# Patient Record
Sex: FEMALE | Race: WHITE | NOT HISPANIC OR LATINO | Employment: UNEMPLOYED | URBAN - METROPOLITAN AREA
[De-identification: names, ages, dates, MRNs, and addresses within clinical notes are randomized per-mention and may not be internally consistent; named-entity substitution may affect disease eponyms.]

---

## 2021-07-22 ENCOUNTER — APPOINTMENT (OUTPATIENT)
Dept: RADIOLOGY | Facility: MEDICAL CENTER | Age: 31
End: 2021-07-22
Attending: EMERGENCY MEDICINE
Payer: COMMERCIAL

## 2021-07-22 ENCOUNTER — HOSPITAL ENCOUNTER (EMERGENCY)
Facility: MEDICAL CENTER | Age: 31
End: 2021-07-22
Attending: EMERGENCY MEDICINE
Payer: COMMERCIAL

## 2021-07-22 VITALS
BODY MASS INDEX: 45.99 KG/M2 | HEIGHT: 67 IN | OXYGEN SATURATION: 95 % | WEIGHT: 293 LBS | RESPIRATION RATE: 20 BRPM | TEMPERATURE: 98.1 F | SYSTOLIC BLOOD PRESSURE: 140 MMHG | DIASTOLIC BLOOD PRESSURE: 79 MMHG | HEART RATE: 82 BPM

## 2021-07-22 DIAGNOSIS — R10.31 RIGHT LOWER QUADRANT ABDOMINAL PAIN: ICD-10-CM

## 2021-07-22 LAB
ALBUMIN SERPL BCP-MCNC: 4.4 G/DL (ref 3.2–4.9)
ALBUMIN/GLOB SERPL: 1.4 G/DL
ALP SERPL-CCNC: 74 U/L (ref 30–99)
ALT SERPL-CCNC: 31 U/L (ref 2–50)
ANION GAP SERPL CALC-SCNC: 13 MMOL/L (ref 7–16)
AST SERPL-CCNC: 35 U/L (ref 12–45)
BASOPHILS # BLD AUTO: 0.6 % (ref 0–1.8)
BASOPHILS # BLD: 0.04 K/UL (ref 0–0.12)
BILIRUB SERPL-MCNC: 0.4 MG/DL (ref 0.1–1.5)
BUN SERPL-MCNC: 15 MG/DL (ref 8–22)
CALCIUM SERPL-MCNC: 9 MG/DL (ref 8.5–10.5)
CHLORIDE SERPL-SCNC: 103 MMOL/L (ref 96–112)
CO2 SERPL-SCNC: 25 MMOL/L (ref 20–33)
CREAT SERPL-MCNC: 0.89 MG/DL (ref 0.5–1.4)
EOSINOPHIL # BLD AUTO: 0.12 K/UL (ref 0–0.51)
EOSINOPHIL NFR BLD: 1.8 % (ref 0–6.9)
ERYTHROCYTE [DISTWIDTH] IN BLOOD BY AUTOMATED COUNT: 45.8 FL (ref 35.9–50)
GLOBULIN SER CALC-MCNC: 3.1 G/DL (ref 1.9–3.5)
GLUCOSE SERPL-MCNC: 86 MG/DL (ref 65–99)
HCG SERPL QL: NEGATIVE
HCT VFR BLD AUTO: 45.5 % (ref 37–47)
HGB BLD-MCNC: 14.7 G/DL (ref 12–16)
IMM GRANULOCYTES # BLD AUTO: 0.02 K/UL (ref 0–0.11)
IMM GRANULOCYTES NFR BLD AUTO: 0.3 % (ref 0–0.9)
LIPASE SERPL-CCNC: 33 U/L (ref 11–82)
LYMPHOCYTES # BLD AUTO: 2.75 K/UL (ref 1–4.8)
LYMPHOCYTES NFR BLD: 40.1 % (ref 22–41)
MCH RBC QN AUTO: 30.8 PG (ref 27–33)
MCHC RBC AUTO-ENTMCNC: 32.3 G/DL (ref 33.6–35)
MCV RBC AUTO: 95.2 FL (ref 81.4–97.8)
MONOCYTES # BLD AUTO: 0.46 K/UL (ref 0–0.85)
MONOCYTES NFR BLD AUTO: 6.7 % (ref 0–13.4)
NEUTROPHILS # BLD AUTO: 3.46 K/UL (ref 2–7.15)
NEUTROPHILS NFR BLD: 50.5 % (ref 44–72)
NRBC # BLD AUTO: 0 K/UL
NRBC BLD-RTO: 0 /100 WBC
PLATELET # BLD AUTO: 241 K/UL (ref 164–446)
PMV BLD AUTO: 10.7 FL (ref 9–12.9)
POTASSIUM SERPL-SCNC: 4 MMOL/L (ref 3.6–5.5)
PROT SERPL-MCNC: 7.5 G/DL (ref 6–8.2)
RBC # BLD AUTO: 4.78 M/UL (ref 4.2–5.4)
SODIUM SERPL-SCNC: 141 MMOL/L (ref 135–145)
WBC # BLD AUTO: 6.9 K/UL (ref 4.8–10.8)

## 2021-07-22 PROCEDURE — 85025 COMPLETE CBC W/AUTO DIFF WBC: CPT

## 2021-07-22 PROCEDURE — 99284 EMERGENCY DEPT VISIT MOD MDM: CPT

## 2021-07-22 PROCEDURE — 74177 CT ABD & PELVIS W/CONTRAST: CPT

## 2021-07-22 PROCEDURE — 80053 COMPREHEN METABOLIC PANEL: CPT

## 2021-07-22 PROCEDURE — 700117 HCHG RX CONTRAST REV CODE 255: Performed by: EMERGENCY MEDICINE

## 2021-07-22 PROCEDURE — 36415 COLL VENOUS BLD VENIPUNCTURE: CPT

## 2021-07-22 PROCEDURE — 83690 ASSAY OF LIPASE: CPT

## 2021-07-22 PROCEDURE — 84703 CHORIONIC GONADOTROPIN ASSAY: CPT

## 2021-07-22 RX ADMIN — IOHEXOL 100 ML: 350 INJECTION, SOLUTION INTRAVENOUS at 20:16

## 2021-07-22 ASSESSMENT — PAIN DESCRIPTION - PAIN TYPE: TYPE: ACUTE PAIN

## 2021-07-22 NOTE — ED TRIAGE NOTES
.  Chief Complaint   Patient presents with   • RLQ Pain     nausea      Pt ambulate to triage with above complaint. Pt was sent from [facility] for CT scan for possible pancreatitis. Pt reports RLQ pain since last night. Pt given Zofran for nausea w/ mild relief of symptoms. Pt has copy of medical record from [facility] with her.    Pt educated on triage process and returned to [facility].

## 2021-07-23 NOTE — ED NOTES
Pt presenting to ER for r/o appy. IV placed, labs collected and sent. Connected to monitoring, vss at this time. Currently pt reporting 0/10 pain RUQ. Call light within reach. Will continue to monitor

## 2021-07-23 NOTE — ED NOTES
DC papers provided, all questions addressed at this time. IV removed prior to DC. Pt and family amb off unit

## 2021-07-23 NOTE — ED PROVIDER NOTES
"ED Provider Note    CHIEF COMPLAINT  Nominal pain  Concern for appendicitis    HPI  Zakia Garzon is a 30 y.o. female who presents with a chief complaint of abdominal pain and here to make sure it is not appendicitis    The patient is from [place] apparently there is CT scan is down.  She has had pain in the right lower quadrant right flank area duration has been now for 1 full day.  It is intermittent.  There is no essentially alleviating or exacerbating factors associate by nausea she has no significant pain at this time    Patient called her mom who is a nurse who had appendicitis as well states that she had similar symptoms    She does have history of adhesions from a gastric sleeve and also gallbladder/cholecystectomy.  However she states that this pain is \"different\".    REVIEW OF SYSTEMS  General: No fever or chills.  Eyes: No eye discharge. No eye pain.  Ear nose throat: No sore throat or  trouble swallowing.  Pulmonary: No shortness of breath or cough.  Cardiovascular: No chest pain or chest pressure.  GI: See above.  : No dysuria or hematuria  Dermatologic: No rashes. No abrasions.  Neurologic: No weakness or numbness.      All other systems are negative      PAST MEDICAL HISTORY  History reviewed. No pertinent past medical history.    FAMILY HISTORY  History reviewed. No pertinent family history.    SOCIAL HISTORY  Social History     Socioeconomic History   • Marital status: Not on file     Spouse name: Not on file   • Number of children: Not on file   • Years of education: Not on file   • Highest education level: Not on file   Occupational History   • Not on file   Tobacco Use   • Smoking status: Current Every Day Smoker     Types: Cigarettes   Substance and Sexual Activity   • Alcohol use: Yes   • Drug use: Not Currently   • Sexual activity: Not on file   Other Topics Concern   • Not on file   Social History Narrative   • Not on file     Social Determinants of Health     Financial Resource Strain:    • " "Difficulty of Paying Living Expenses:    Food Insecurity:    • Worried About Running Out of Food in the Last Year:    • Ran Out of Food in the Last Year:    Transportation Needs:    • Lack of Transportation (Medical):    • Lack of Transportation (Non-Medical):    Physical Activity:    • Days of Exercise per Week:    • Minutes of Exercise per Session:    Stress:    • Feeling of Stress :    Social Connections:    • Frequency of Communication with Friends and Family:    • Frequency of Social Gatherings with Friends and Family:    • Attends Bahai Services:    • Active Member of Clubs or Organizations:    • Attends Club or Organization Meetings:    • Marital Status:    Intimate Partner Violence:    • Fear of Current or Ex-Partner:    • Emotionally Abused:    • Physically Abused:    • Sexually Abused:        SURGICAL HISTORY  History reviewed. No pertinent surgical history.    CURRENT MEDICATIONS  Home Medications     Reviewed by Tash Harrell R.N. (Registered Nurse) on 07/22/21 at 1626  Med List Status: Not Addressed   Medication Last Dose Status        Patient Tim Taking any Medications                        ALLERGIES  Allergies   Allergen Reactions   • Vicodin [Apap-Fd&C Yellow #10 Al Castro-Hydrocodone]      weakness       PHYSICAL EXAM  VITAL SIGNS: /68   Pulse 80   Temp 36.7 °C (98.1 °F) (Temporal)   Resp 20   Ht 1.702 m (5' 7\")   Wt (!) 157 kg (345 lb 7.4 oz)   SpO2 97%   BMI 54.11 kg/m²   Constitutional: Well developed, Well nourished, No acute distress, Non-toxic appearance.   HENT: Normocephalic, Atraumatic, Bilateral external ears normal, Oropharynx moist, No oral exudates, Nose normal.   Eyes: PERRLA, EOMI, Conjunctiva normal, No discharge.   Musculoskeletal: Neck has normal range of motion, No tenderness, Supple.   Lymphatic: No cervical lymphadenopathy noted.   Cardiovascular: Normal heart rate, Normal rhythm, No murmurs, No rubs, No gallops.   Thorax & Lungs: Normal breath sounds, No " respiratory distress, No wheezing, No chest tenderness.   Abdomen: Nondistended minimal right lower and right lower upper quadrant tenderness no rebound or guarding  Skin: Warm, Dry, No erythema, No rash.   : No CVA tenderness.   Psychiatric: Calm, not anxious  Neurologic: Alert & oriented, moves all extremities equally    RADIOLOGY/PROCEDURES  Radiologist t called me and informed me that the patient had a normal CT scan of the appendix and of the pancreas and no acute findings were noted    COURSE & MEDICAL DECISION MAKING  Pertinent Labs & Imaging studies reviewed. (See chart for details)  This is a 30-year-old female here ruled out for appendicitis exam is not really consistent with a history of previous surgeries and her exam at this point I cannot be 100 certain its not.  In addition she sent here for this test and I think we sent her back things would only worsen and she would need further evaluation.    Patient had a CT scan that was negative as per the radiologist who called me verbally and told me at this point is a well-appearing female 30 years old with a nonsurgical abdomen with some tenderness in that area for 24 hours comes in to rule out appendicitis there is no signs of appendicitis and she safely discharged home of asked her to follow-up in 12 to 24 hours with her doctor in the ER there or come back here if she is not getting better or getting worse.  This point the patient observed by several doctors she appears not be getting clinically worse by reading the note he does not have a fever here or an elevated white cell count.    FINAL IMPRESSION  1.  Nonspecific abdominal pain  2.   3.      Electronically signed by: Otoniel Keyes M.D., 7/22/2021 7:48 PM

## 2023-06-26 NOTE — PROGRESS NOTES
"Pulmonary Clinic- Initial Consult    Date of Service: 6/27/23    Referring Physician: No ref. provider found    Reason for Consult: COPD/Asthma    Chief Complaint:   Chief Complaint   Patient presents with    Establish Care     Referred by [facility] for COPD with asthma/Chronic respiratory failure with hypercapnia    Other     PFT 08/02/22, CXR 02/15/23       HPI:   Zakia Garzon is a 32 y.o. female who is followed by Dr. Benavidez and is referred to the pulmonary clinic for COPD/Asthma.  Zakia is a recovering alcoholic.  She has been sober for 1 month.  She states she has been alcoholic for about 8 years but has been sober for some periods like when she had her daughter and a few other stints in rehab.  She states she had COVID last fall after which she contracted a severe bacterial pneumonia which she states was associated with hemoptysis.  She was admitted to [facility] at that time.  She had a persistent cough with shortness of breath after that 4 months associated with nighttime wakings with coughing fits.  She states nothing worked she tried pills, cough syrups, and all the home remedies she can think of in her cough remained.  Most recently her cough is improved.  She continues with a hoarse voice, sore throat which she associates with the summer allergies.  She is currently not taking anything for allergies as \"nothing works\".  Unfortunately her imaging is not available for review today.  She states she has had chest x-rays and a CT scan.  She was diagnosed with \"COPD\" at the emergency room a few months ago.  She quit smoking last fall when she got COVID and states she had been smoking about a half a pack a day since the age of 15.  This is approximately 7 pack years.  Pulmonary function testing is available for review from 2022 and demonstrates normal lung function.  She is not currently using any inhalers.  She has acid reflux and uses pantoprazole.  She denies any history or symptoms " of sleep apnea.  She did have bariatric surgery in  and lost 140 pounds but regained this when she started drinking again.  She states she drops weight rather quickly when she is sober.      Past Medical History:   Diagnosis Date    Cough     Painful breathing     Shortness of breath     Sputum production     Wheezing        History reviewed. No pertinent surgical history.    Social History     Socioeconomic History    Marital status:      Spouse name: Not on file    Number of children: Not on file    Years of education: Not on file    Highest education level: Not on file   Occupational History    Not on file   Tobacco Use    Smoking status: Former     Packs/day: 0.50     Years: 17.00     Pack years: 8.50     Types: Cigarettes     Quit date: 2022     Years since quittin.9    Smokeless tobacco: Not on file   Vaping Use    Vaping Use: Never used   Substance and Sexual Activity    Alcohol use: Yes    Drug use: Not Currently    Sexual activity: Not on file   Other Topics Concern    Not on file   Social History Narrative    Not on file     Social Determinants of Health     Financial Resource Strain: Not on file   Food Insecurity: Not on file   Transportation Needs: Not on file   Physical Activity: Not on file   Stress: Not on file   Social Connections: Not on file   Intimate Partner Violence: Not on file   Housing Stability: Not on file          History reviewed. No pertinent family history.    Current Outpatient Medications on File Prior to Visit   Medication Sig Dispense Refill    buPROPion (WELLBUTRIN XL) 300 MG XL tablet Take 300 mg by mouth every day.      FLUoxetine HCl 60 MG Tab Take 1 Tablet by mouth every day.      colestipol (COLESTID) 1 GM Tab Take 1 g by mouth every day.      pantoprazole (PROTONIX) 40 MG Tablet Delayed Response Take 80 mg by mouth 2 times a day.      SETLAKIN 0.15-0.03 MG per tablet Take 1 Tablet by mouth every morning.       No current facility-administered medications on  "file prior to visit.       Allergies: Vicodin [apap-fd&c yellow #10 al spaulding-hydrocodone]      ROS:   Review of Systems   Constitutional:  Positive for malaise/fatigue. Negative for chills and fever.   HENT:  Positive for sore throat. Negative for sinus pain.    Respiratory:  Positive for cough and shortness of breath. Negative for hemoptysis and sputum production.    Cardiovascular:  Positive for leg swelling. Negative for chest pain and palpitations.   Gastrointestinal:  Positive for heartburn.   Musculoskeletal:  Negative for myalgias.   Neurological:  Negative for dizziness, weakness and headaches.   Endo/Heme/Allergies:  Positive for environmental allergies.   Psychiatric/Behavioral:  Positive for substance abuse.        Vitals:  /84 (BP Location: Left arm, Patient Position: Sitting, BP Cuff Size: Adult)   Pulse 83   Ht 1.702 m (5' 7\")   Wt (!) 153 kg (337 lb)   SpO2 91%     Physical Exam:  Physical Exam  Constitutional:       Appearance: She is obese.   HENT:      Head: Atraumatic.      Mouth/Throat:      Mouth: Mucous membranes are moist.   Cardiovascular:      Rate and Rhythm: Normal rate and regular rhythm.      Heart sounds: Normal heart sounds.   Pulmonary:      Effort: Pulmonary effort is normal. No respiratory distress.      Breath sounds: Normal breath sounds. No wheezing or rales.   Abdominal:      Palpations: Abdomen is soft.   Musculoskeletal:         General: No swelling, tenderness or deformity.   Skin:     General: Skin is warm and dry.   Neurological:      General: No focal deficit present.      Mental Status: She is alert and oriented to person, place, and time.           Vaccinations:    Pneumovax: Not due  Covid: Due  Annual Flu: Due in fall    Pertinent Studies:  Laboratory Data:    6MWT:    PFTs as reviewed by me personally show:    Imaging as reviewed by me personally show:      Pertinent Cardiac Studies:  Echo:      Assessment/Plan:    Problem List Items Addressed This Visit  "      Shortness of breath     This is likely multifactorial due to alcohol abuse history, obesity, deconditioning, history of covid, history of possible asthma and severe pneumonia last winter.  Plan:  - Echo to assess for cardiac causes of SOB  - PFT with bronchodilator  - CT chest to document resolution of severe pneumonia  - Trial Breo with samples  - Advair sent to pharmacy, patient to call insurance to see what is covered if advair is expensive.   - Albuterol as needed  - Use allergy medication  - Follow up in 2-3 months         Relevant Medications    fluticasone-salmeterol (ADVAIR HFA) 230-21 MCG/ACT inhaler    fluticasone furoate-vilanterol (BREO ELLIPTA) 100-25 MCG/ACT AEROSOL POWDER, BREATH ACTIVATED    Other Relevant Orders    CT-CHEST (THORAX) W/O    EC-ECHOCARDIOGRAM COMPLETE W/O CONT    PULMONARY FUNCTION TESTS -Test requested: Complete Pulmonary Function Test        Return in about 10 weeks (around 9/5/2023).     This note was generated using voice recognition software which has a chance of producing errors of grammar and possibly content.  I have made every reasonable attempt to find and correct any obvious errors, but it should be expected that some may not be found prior to finalization of this note.    Time spent in record review prior to patient arrival, reviewing results, and in face-to-face encounter totaled 45 min, excluding any procedures if performed.      Jessica Childs MD RD  Pulmonary and Critical Care Medicine  [facility]

## 2023-06-27 ENCOUNTER — OFFICE VISIT (OUTPATIENT)
Dept: SLEEP MEDICINE | Facility: MEDICAL CENTER | Age: 33
End: 2023-06-27
Attending: INTERNAL MEDICINE
Payer: COMMERCIAL

## 2023-06-27 VITALS
DIASTOLIC BLOOD PRESSURE: 84 MMHG | OXYGEN SATURATION: 91 % | HEART RATE: 83 BPM | BODY MASS INDEX: 45.99 KG/M2 | SYSTOLIC BLOOD PRESSURE: 114 MMHG | WEIGHT: 293 LBS | HEIGHT: 67 IN

## 2023-06-27 DIAGNOSIS — R06.02 SHORTNESS OF BREATH: ICD-10-CM

## 2023-06-27 PROCEDURE — 99204 OFFICE O/P NEW MOD 45 MIN: CPT | Performed by: INTERNAL MEDICINE

## 2023-06-27 PROCEDURE — 99213 OFFICE O/P EST LOW 20 MIN: CPT | Performed by: INTERNAL MEDICINE

## 2023-06-27 PROCEDURE — 94664 DEMO&/EVAL PT USE INHALER: CPT | Performed by: INTERNAL MEDICINE

## 2023-06-27 PROCEDURE — 3079F DIAST BP 80-89 MM HG: CPT | Performed by: INTERNAL MEDICINE

## 2023-06-27 PROCEDURE — 3074F SYST BP LT 130 MM HG: CPT | Performed by: INTERNAL MEDICINE

## 2023-06-27 RX ORDER — LEVONORGESTREL AND ETHINYL ESTRADIOL 0.15-0.03
1 KIT ORAL EVERY MORNING
COMMUNITY
Start: 2023-06-06

## 2023-06-27 RX ORDER — PANTOPRAZOLE SODIUM 40 MG/1
80 TABLET, DELAYED RELEASE ORAL 2 TIMES DAILY
COMMUNITY
Start: 2023-06-05

## 2023-06-27 RX ORDER — FLUOXETINE HYDROCHLORIDE 60 MG/1
1 TABLET, FILM COATED ORAL; ORAL DAILY
COMMUNITY
Start: 2023-06-19

## 2023-06-27 RX ORDER — FLUTICASONE FUROATE AND VILANTEROL 100; 25 UG/1; UG/1
1 POWDER RESPIRATORY (INHALATION) DAILY
Qty: 2 EACH | Refills: 0 | COMMUNITY
Start: 2023-06-27 | End: 2023-10-06

## 2023-06-27 RX ORDER — MONTELUKAST SODIUM 4 MG/1
1 TABLET, CHEWABLE ORAL DAILY
COMMUNITY
Start: 2023-06-01

## 2023-06-27 RX ORDER — FLUTICASONE PROPIONATE AND SALMETEROL XINAFOATE 230; 21 UG/1; UG/1
2 AEROSOL, METERED RESPIRATORY (INHALATION) 2 TIMES DAILY
Qty: 12 G | Refills: 0 | Status: SHIPPED | OUTPATIENT
Start: 2023-06-27 | End: 2023-07-26

## 2023-06-27 RX ORDER — BUPROPION HYDROCHLORIDE 300 MG/1
300 TABLET ORAL DAILY
COMMUNITY
Start: 2023-06-14 | End: 2023-10-06

## 2023-06-27 ASSESSMENT — ENCOUNTER SYMPTOMS
SPUTUM PRODUCTION: 0
CHILLS: 0
SINUS PAIN: 0
FEVER: 0
SORE THROAT: 1
WEAKNESS: 0
PALPITATIONS: 0
HEADACHES: 0
HEMOPTYSIS: 0
SHORTNESS OF BREATH: 1
HEARTBURN: 1
COUGH: 1
DIZZINESS: 0
MYALGIAS: 0

## 2023-06-27 ASSESSMENT — PATIENT HEALTH QUESTIONNAIRE - PHQ9: CLINICAL INTERPRETATION OF PHQ2 SCORE: 0

## 2023-06-27 ASSESSMENT — FIBROSIS 4 INDEX: FIB4 SCORE: 0.86

## 2023-06-27 ASSESSMENT — LIFESTYLE VARIABLES: SUBSTANCE_ABUSE: 1

## 2023-06-27 NOTE — ASSESSMENT & PLAN NOTE
This is likely multifactorial due to alcohol abuse history, obesity, deconditioning, history of covid, history of possible asthma and severe pneumonia last winter.  Plan:  - Echo to assess for cardiac causes of SOB  - PFT with bronchodilator  - CT chest to document resolution of severe pneumonia  - Trial Breo with samples  - Advair sent to pharmacy, patient to call insurance to see what is covered if advair is expensive.   - Albuterol as needed  - Use allergy medication  - Follow up in 2-3 months

## 2023-06-28 ENCOUNTER — HOSPITAL ENCOUNTER (OUTPATIENT)
Dept: RADIOLOGY | Facility: MEDICAL CENTER | Age: 33
End: 2023-06-28
Payer: COMMERCIAL

## 2023-07-04 ENCOUNTER — HOSPITAL ENCOUNTER (OUTPATIENT)
Dept: RADIOLOGY | Facility: MEDICAL CENTER | Age: 33
End: 2023-07-04
Attending: INTERNAL MEDICINE
Payer: COMMERCIAL

## 2023-07-04 ENCOUNTER — HOSPITAL ENCOUNTER (OUTPATIENT)
Dept: CARDIOLOGY | Facility: MEDICAL CENTER | Age: 33
End: 2023-07-04
Attending: INTERNAL MEDICINE
Payer: COMMERCIAL

## 2023-07-04 DIAGNOSIS — R06.02 SHORTNESS OF BREATH: ICD-10-CM

## 2023-07-04 LAB — LV EJECT FRACT  99904: 60

## 2023-07-04 PROCEDURE — 93306 TTE W/DOPPLER COMPLETE: CPT

## 2023-07-04 PROCEDURE — 71250 CT THORAX DX C-: CPT

## 2023-07-04 PROCEDURE — 93306 TTE W/DOPPLER COMPLETE: CPT | Mod: 26 | Performed by: INTERNAL MEDICINE

## 2023-07-23 DIAGNOSIS — R06.02 SHORTNESS OF BREATH: ICD-10-CM

## 2023-07-25 NOTE — TELEPHONE ENCOUNTER
Caller Name: Zakia Garzon                 Call Back Number: [phone] (home)         Patient approves a detailed voicemail message:     Have we ever prescribed this med? Yes.  If yes, what date? 6/27/23    Last OV: 6/27/23 with Dr. Childs     Next OV: 10/6/23 W/ Dr. Childs     DX: SOB    Medications:FLUTICASONE-SALMETEROL 230-21

## 2023-07-26 RX ORDER — FLUTICASONE PROPIONATE AND SALMETEROL XINAFOATE 230; 21 UG/1; UG/1
AEROSOL, METERED RESPIRATORY (INHALATION)
Qty: 2 EACH | Refills: 0 | Status: SHIPPED | OUTPATIENT
Start: 2023-07-26 | End: 2023-10-06 | Stop reason: SDUPTHER

## 2023-10-06 ENCOUNTER — OFFICE VISIT (OUTPATIENT)
Dept: SLEEP MEDICINE | Facility: MEDICAL CENTER | Age: 33
End: 2023-10-06
Attending: INTERNAL MEDICINE
Payer: COMMERCIAL

## 2023-10-06 VITALS
OXYGEN SATURATION: 93 % | HEIGHT: 67 IN | BODY MASS INDEX: 45.99 KG/M2 | DIASTOLIC BLOOD PRESSURE: 72 MMHG | SYSTOLIC BLOOD PRESSURE: 124 MMHG | WEIGHT: 293 LBS | HEART RATE: 85 BPM

## 2023-10-06 DIAGNOSIS — R06.02 SHORTNESS OF BREATH: ICD-10-CM

## 2023-10-06 DIAGNOSIS — J45.40 MODERATE PERSISTENT ASTHMA, UNSPECIFIED WHETHER COMPLICATED: ICD-10-CM

## 2023-10-06 PROCEDURE — 94726 PLETHYSMOGRAPHY LUNG VOLUMES: CPT | Performed by: INTERNAL MEDICINE

## 2023-10-06 PROCEDURE — 94729 DIFFUSING CAPACITY: CPT | Mod: 26 | Performed by: INTERNAL MEDICINE

## 2023-10-06 PROCEDURE — 94060 EVALUATION OF WHEEZING: CPT | Mod: 26 | Performed by: INTERNAL MEDICINE

## 2023-10-06 PROCEDURE — 99212 OFFICE O/P EST SF 10 MIN: CPT | Performed by: INTERNAL MEDICINE

## 2023-10-06 PROCEDURE — 99213 OFFICE O/P EST LOW 20 MIN: CPT | Performed by: INTERNAL MEDICINE

## 2023-10-06 PROCEDURE — 3074F SYST BP LT 130 MM HG: CPT | Performed by: INTERNAL MEDICINE

## 2023-10-06 PROCEDURE — 3078F DIAST BP <80 MM HG: CPT | Performed by: INTERNAL MEDICINE

## 2023-10-06 PROCEDURE — 94726 PLETHYSMOGRAPHY LUNG VOLUMES: CPT | Mod: 26 | Performed by: INTERNAL MEDICINE

## 2023-10-06 PROCEDURE — 94060 EVALUATION OF WHEEZING: CPT | Performed by: INTERNAL MEDICINE

## 2023-10-06 PROCEDURE — 94729 DIFFUSING CAPACITY: CPT | Performed by: INTERNAL MEDICINE

## 2023-10-06 RX ORDER — LORATADINE 10 MG/1
10 TABLET ORAL DAILY
Qty: 30 TABLET | Refills: 6 | Status: SHIPPED | OUTPATIENT
Start: 2023-10-06

## 2023-10-06 RX ORDER — FLUTICASONE PROPIONATE AND SALMETEROL XINAFOATE 230; 21 UG/1; UG/1
AEROSOL, METERED RESPIRATORY (INHALATION)
Qty: 2 EACH | Refills: 6 | Status: SHIPPED | OUTPATIENT
Start: 2023-10-06

## 2023-10-06 RX ORDER — MONTELUKAST SODIUM 10 MG/1
10 TABLET ORAL
Qty: 30 TABLET | Refills: 6 | Status: SHIPPED | OUTPATIENT
Start: 2023-10-06

## 2023-10-06 ASSESSMENT — PULMONARY FUNCTION TESTS
FEV1/FVC: 91
FEV1_PERCENT_PREDICTED: 90
FVC_PERCENT_PREDICTED: 84
FEV1/FVC_PERCENT_PREDICTED: 107
FVC: 3.47
FEV1/FVC_PERCENT_LLN: 70
FVC_LLN: 3.49
FVC_PERCENT_PREDICTED: 82
FEV1/FVC_PERCENT_PREDICTED: 107
FEV1: 3.16
FEV1_PERCENT_CHANGE: 0
FEV1: 3.15
FEV1/FVC_PERCENT_PREDICTED: 111
FVC: 3.52
FEV1/FVC_PERCENT_PREDICTED: 83
FEV1_PREDICTED: 3.47
FEV1/FVC: 91.07
FEV1/FVC_PREDICTED: 84
FEV1_LLN: 2.90
FEV1/FVC_PERCENT_PREDICTED: 109
FEV1/FVC_PERCENT_CHANGE: 0
FEV1_PERCENT_PREDICTED: 91
FEV1/FVC: 90
FEV1_PERCENT_CHANGE: -1
FVC_PREDICTED: 4.18
FEV1/FVC: 89
FEV1/FVC_PERCENT_CHANGE: 1

## 2023-10-06 ASSESSMENT — ENCOUNTER SYMPTOMS
SPUTUM PRODUCTION: 0
DIZZINESS: 0
HEARTBURN: 1
PALPITATIONS: 0
WEAKNESS: 0
SINUS PAIN: 0
COUGH: 0
FEVER: 0
HEMOPTYSIS: 0
SHORTNESS OF BREATH: 1
HEADACHES: 0
SORE THROAT: 0
MYALGIAS: 0
CHILLS: 0

## 2023-10-06 ASSESSMENT — LIFESTYLE VARIABLES: SUBSTANCE_ABUSE: 1

## 2023-10-06 NOTE — LETTER
October 6, 2023    To Whom It May Concern:         This is confirmation that Zakia Trudy Duran attended her scheduled appointment with Jessica Childs M.D. on 10/06/23.         If you have any questions please do not hesitate to call me at the phone number listed below.    Sincerely,          Jessica Childs M.D.  [phone]

## 2023-10-06 NOTE — PROCEDURES
Technician: RACHEAL Brantley    Technician Comment:  Good patient effort & cooperation.  The results of this test meet the ATS/ERS standards for reproducibility.  Test was performed on the [employer] Plethysmograph-Elite DX system.  Predicted values were GLI-2012 for spirometry, GLI-2020 for DLCO, ITS for Lung Volumes.  The DLCO was uncorrected for Hgb.  A bronchodilator of Albuterol HFA -2puffs via spacer administered.  DLCO performed during dilation period.    Test did not meet criteria for acceptability, patient only exhaled 5 seconds    Interpretation  1.  There is no obstruction  2.  There is no bronchodilator response  3.  Lung volumes are normal  4.  DLCO was normal  5.  Flow volume loop is normal    Normal PFTs, however, patient did not fully exhale to meet acceptability criteria    Zainab Saunders,    Pulmonary and Critical Care

## 2023-10-06 NOTE — PROGRESS NOTES
"Pulmonary Clinic- Follow up    Date of Service: 10/6/23    Referring Physician: No ref. provider found    Reason for Consult: COPD/Asthma    Chief Complaint:   Chief Complaint   Patient presents with    Follow-Up     SOB. Last seen 06/27/23    Results     CT-Chest 07/04/23, Echo 07/04/23, PFT 10/06/23    Medication Management     Trial: advair-working well for pt       HPI:   Zakia Garzon is a 32 y.o. female who is followed in the pulmonary clinic for COPD/Asthma, last seen 6/27/23.  Zakia is a recovering alcoholic.  She has been sober for 1 month.  She states she has been alcoholic for about 8 years but has been sober for some periods like when she had her daughter and a few other stints in rehab.  She states she had COVID last fall after which she contracted a severe bacterial pneumonia which she states was associated with hemoptysis.  She was admitted to [facility] at that time.  She had a persistent cough with shortness of breath after that 4 months associated with nighttime wakings with coughing fits.  She states nothing worked she tried pills, cough syrups, and all the home remedies she can think of in her cough remained.  Most recently her cough is improved.  She continues with a hoarse voice, sore throat which she associates with the summer allergies.  She is currently not taking anything for allergies as \"nothing works\".  Unfortunately her imaging is not available for review today.  She states she has had chest x-rays and a CT scan.  She was diagnosed with \"COPD\" at the emergency room a few months ago.  She quit smoking last fall when she got COVID and states she had been smoking about a half a pack a day since the age of 15.  This is approximately 7 pack years.  Pulmonary function testing is available for review from 2022 and demonstrates normal lung function.  She is not currently using any inhalers.  She has acid reflux and uses pantoprazole.  She denies any history or symptoms of sleep " apnea.  She did have bariatric surgery in  and lost 140 pounds but regained this when she started drinking again.  She states she drops weight rather quickly when she is sober.    Today 10/6/23 - Her breathing is much better on advair.  She does still have some shortness of breath but it is random and she is unable to pinpoint when it happens or what triggers it.  She has albuterol and nebulizer at home.  We discussed her CT and the GGO which could represent appropriate resolution of her prior pneumonia but also could be concerning for HP.  She quit smoking but continues to struggle with alcoholism and is drinking daily.      Past Medical History:   Diagnosis Date    Cough     Painful breathing     Shortness of breath     Sputum production     Wheezing        No past surgical history on file.    Social History     Socioeconomic History    Marital status:      Spouse name: Not on file    Number of children: Not on file    Years of education: Not on file    Highest education level: Not on file   Occupational History    Not on file   Tobacco Use    Smoking status: Former     Current packs/day: 0.00     Average packs/day: 0.5 packs/day for 17.0 years (8.5 ttl pk-yrs)     Types: Cigarettes     Start date: 2005     Quit date: 2022     Years since quittin.2    Smokeless tobacco: Not on file   Vaping Use    Vaping Use: Never used   Substance and Sexual Activity    Alcohol use: Yes    Drug use: Not Currently    Sexual activity: Not on file   Other Topics Concern    Not on file   Social History Narrative    Not on file     Social Determinants of Health     Financial Resource Strain: Not on file   Food Insecurity: Not on file   Transportation Needs: Not on file   Physical Activity: Not on file   Stress: Not on file   Social Connections: Not on file   Intimate Partner Violence: Not on file   Housing Stability: Not on file          No family history on file.    Current Outpatient Medications on File Prior  "to Visit   Medication Sig Dispense Refill    FLUoxetine HCl 60 MG Tab Take 1 Tablet by mouth every day.      colestipol (COLESTID) 1 GM Tab Take 1 g by mouth every day.      pantoprazole (PROTONIX) 40 MG Tablet Delayed Response Take 80 mg by mouth 2 times a day.      SETLAKIN 0.15-0.03 MG per tablet Take 1 Tablet by mouth every morning.       No current facility-administered medications on file prior to visit.       Allergies: Vicodin [apap-fd&c yellow #10 al spaulding-hydrocodone]      ROS:   Review of Systems   Constitutional:  Negative for chills, fever and malaise/fatigue.   HENT:  Negative for sinus pain and sore throat.    Respiratory:  Positive for shortness of breath. Negative for cough, hemoptysis and sputum production.    Cardiovascular:  Positive for leg swelling. Negative for chest pain and palpitations.   Gastrointestinal:  Positive for heartburn.   Musculoskeletal:  Negative for myalgias.   Neurological:  Negative for dizziness, weakness and headaches.   Endo/Heme/Allergies:  Positive for environmental allergies.   Psychiatric/Behavioral:  Positive for substance abuse.        Vitals:  /72 (BP Location: Left arm, Patient Position: Sitting, BP Cuff Size: Adult)   Pulse 85   Ht 1.702 m (5' 7\")   Wt (!) 165 kg (364 lb)   SpO2 93%     Physical Exam:  Physical Exam  Constitutional:       Appearance: She is obese.   HENT:      Head: Atraumatic.      Mouth/Throat:      Mouth: Mucous membranes are moist.   Cardiovascular:      Rate and Rhythm: Normal rate and regular rhythm.      Heart sounds: Normal heart sounds.   Pulmonary:      Effort: Pulmonary effort is normal. No respiratory distress.      Breath sounds: Normal breath sounds. No wheezing or rales.   Abdominal:      Palpations: Abdomen is soft.   Musculoskeletal:         General: No swelling, tenderness or deformity.   Skin:     General: Skin is warm and dry.   Neurological:      General: No focal deficit present.      Mental Status: She is alert and " oriented to person, place, and time.           Vaccinations:    Pneumovax: Not due  Covid: Due  Annual Flu: Due in fall    Pertinent Studies:  Laboratory Data:    6MWT:    PFTs as reviewed by me personally show:        Imaging as reviewed by me personally show:    CT 7/4/23:  IMPRESSION:     Hazy nodular densities and groundglass opacities in the RIGHT upper and lower lobes consistent with pneumonitis.    Pertinent Cardiac Studies:  Echo:  CONCLUSIONS  Normal left ventricular systolic function.  Mild concentric left ventricular hypertrophy.  Estimated right ventricular systolic pressure is 40 mmHg.    Assessment/Plan:    Problem List Items Addressed This Visit       Shortness of breath     Improved significantly with advair, likely asthma  Plan:  - Continue advair  - Albuterol and duonebs as needed  - HRCT to assess for resolution of GGO or if still present characterize GGO   - If concern for HP on HRCT will send HP panels  - Start claritin and montelukast  - Follow up in 3-4 months with DR. Childs          Relevant Medications    fluticasone-salmeterol (ADVAIR HFA) 230-21 MCG/ACT inhaler    Other Relevant Orders    CT-CHEST, HIGH RESOLUTION LUNG     Other Visit Diagnoses       Moderate persistent asthma, unspecified whether complicated        Relevant Medications    loratadine (CLARITIN) 10 MG Tab    montelukast (SINGULAIR) 10 MG Tab    fluticasone-salmeterol (ADVAIR HFA) 230-21 MCG/ACT inhaler             Return in about 3 months (around 1/6/2024) for Dr. Childs.     This note was generated using voice recognition software which has a chance of producing errors of grammar and possibly content.  I have made every reasonable attempt to find and correct any obvious errors, but it should be expected that some may not be found prior to finalization of this note.    Time spent in record review prior to patient arrival, reviewing results, and in face-to-face encounter totaled 20 min, excluding any procedures if  performed.      Jessica Childs MD RD  Pulmonary and Critical Care Medicine  [facility]

## 2023-10-07 NOTE — ASSESSMENT & PLAN NOTE
Improved significantly with advair, likely asthma  Plan:  - Continue advair  - Albuterol and duonebs as needed  - HRCT to assess for resolution of GGO or if still present characterize GGO   - If concern for HP on HRCT will send HP panels  - Start claritin and montelukast  - Follow up in 3-4 months with DR. Childs

## 2024-05-13 DIAGNOSIS — J45.40 MODERATE PERSISTENT ASTHMA, UNSPECIFIED WHETHER COMPLICATED: ICD-10-CM

## 2024-05-13 NOTE — TELEPHONE ENCOUNTER
Have we ever prescribed this med? Yes.  If yes, what date? 10/06/23    Last OV: 10/06/23 with Dr Childs     Next OV: No Pending appt.     DX:  Moderate persistent asthma, unspecified whether complicated     Medications:   Requested Prescriptions     Pending Prescriptions Disp Refills    montelukast (SINGULAIR) 10 MG Tab [Pharmacy Med Name: MONTELUKAST SOD 10 MG TABLET] 30 Tablet 6     Sig: take 1 tablet by mouth NIGHTLY at bedtime

## 2024-05-15 RX ORDER — MONTELUKAST SODIUM 10 MG/1
10 TABLET ORAL
Qty: 30 TABLET | Refills: 6 | Status: SHIPPED | OUTPATIENT
Start: 2024-05-15

## 2024-05-30 ENCOUNTER — APPOINTMENT (OUTPATIENT)
Dept: LAB | Facility: MEDICAL CENTER | Age: 34
End: 2024-05-30
Payer: COMMERCIAL

## 2025-07-23 ENCOUNTER — APPOINTMENT (OUTPATIENT)
Dept: RADIOLOGY | Facility: MEDICAL CENTER | Age: 35
End: 2025-07-23
Attending: NURSE PRACTITIONER
Payer: COMMERCIAL

## 2025-07-23 DIAGNOSIS — R11.0 NAUSEA: ICD-10-CM

## 2025-07-23 DIAGNOSIS — K21.9 CHALASIA OF LOWER ESOPHAGEAL SPHINCTER: ICD-10-CM

## 2025-07-23 PROCEDURE — 74248 X-RAY SM INT F-THRU STD: CPT
